# Patient Record
Sex: MALE | Race: WHITE
[De-identification: names, ages, dates, MRNs, and addresses within clinical notes are randomized per-mention and may not be internally consistent; named-entity substitution may affect disease eponyms.]

---

## 2020-12-02 ENCOUNTER — HOSPITAL ENCOUNTER (OUTPATIENT)
Dept: HOSPITAL 62 - ER | Age: 55
Setting detail: OBSERVATION
LOS: 2 days | Discharge: HOME | End: 2020-12-04
Attending: STUDENT IN AN ORGANIZED HEALTH CARE EDUCATION/TRAINING PROGRAM | Admitting: HOSPITALIST
Payer: SELF-PAY

## 2020-12-02 DIAGNOSIS — I10: ICD-10-CM

## 2020-12-02 DIAGNOSIS — Z79.899: ICD-10-CM

## 2020-12-02 DIAGNOSIS — J96.02: Primary | ICD-10-CM

## 2020-12-02 DIAGNOSIS — F17.210: ICD-10-CM

## 2020-12-02 DIAGNOSIS — L98.8: ICD-10-CM

## 2020-12-02 DIAGNOSIS — J96.01: ICD-10-CM

## 2020-12-02 DIAGNOSIS — Z79.84: ICD-10-CM

## 2020-12-02 DIAGNOSIS — E66.2: ICD-10-CM

## 2020-12-02 DIAGNOSIS — E11.9: ICD-10-CM

## 2020-12-02 DIAGNOSIS — R60.0: ICD-10-CM

## 2020-12-02 LAB
ADD MANUAL DIFF: NO
ALBUMIN SERPL-MCNC: 4.1 G/DL (ref 3.5–5)
ALP SERPL-CCNC: 82 U/L (ref 38–126)
ANION GAP SERPL CALC-SCNC: 2 MMOL/L (ref 5–19)
APPEARANCE UR: CLEAR
APTT PPP: YELLOW S
AST SERPL-CCNC: 38 U/L (ref 17–59)
BASE EXCESS BLDV CALC-SCNC: 6.3 MMOL/L
BASOPHILS # BLD AUTO: 0 10^3/UL (ref 0–0.2)
BASOPHILS NFR BLD AUTO: 0.4 % (ref 0–2)
BILIRUB DIRECT SERPL-MCNC: 0.1 MG/DL (ref 0–0.4)
BILIRUB SERPL-MCNC: 0.5 MG/DL (ref 0.2–1.3)
BILIRUB UR QL STRIP: NEGATIVE
BUN SERPL-MCNC: 13 MG/DL (ref 7–20)
CALCIUM: 8.7 MG/DL (ref 8.4–10.2)
CHLORIDE SERPL-SCNC: 97 MMOL/L (ref 98–107)
CO2 SERPL-SCNC: 39 MMOL/L (ref 22–30)
CRP SERPL-MCNC: 21.8 MG/L (ref ?–10)
EOSINOPHIL # BLD AUTO: 0.4 10^3/UL (ref 0–0.6)
EOSINOPHIL NFR BLD AUTO: 3.9 % (ref 0–6)
ERYTHROCYTE [DISTWIDTH] IN BLOOD BY AUTOMATED COUNT: 14.1 % (ref 11.5–14)
FERRITIN SERPL-MCNC: 52.9 NG/ML (ref 17.9–464)
GLUCOSE SERPL-MCNC: 139 MG/DL (ref 75–110)
GLUCOSE UR STRIP-MCNC: NEGATIVE MG/DL
HCO3 BLDV-SCNC: 36.8 MMOL/L (ref 20–32)
HCT VFR BLD CALC: 44.5 % (ref 37.9–51)
HGB BLD-MCNC: 14.9 G/DL (ref 13.5–17)
KETONES UR STRIP-MCNC: NEGATIVE MG/DL
LYMPHOCYTES # BLD AUTO: 1.5 10^3/UL (ref 0.5–4.7)
LYMPHOCYTES NFR BLD AUTO: 14.3 % (ref 13–45)
MCH RBC QN AUTO: 32.7 PG (ref 27–33.4)
MCHC RBC AUTO-ENTMCNC: 33.4 G/DL (ref 32–36)
MCV RBC AUTO: 98 FL (ref 80–97)
MONOCYTES # BLD AUTO: 0.6 10^3/UL (ref 0.1–1.4)
MONOCYTES NFR BLD AUTO: 5.8 % (ref 3–13)
NEUTROPHILS # BLD AUTO: 7.9 10^3/UL (ref 1.7–8.2)
NEUTS SEG NFR BLD AUTO: 75.6 % (ref 42–78)
NITRITE UR QL STRIP: NEGATIVE
NT PRO BNP: 101 PG/ML (ref ?–125)
PCO2 BLDV: 82.3 MMHG (ref 35–63)
PH BLDV: 7.27 [PH] (ref 7.3–7.42)
PH UR STRIP: 5 [PH] (ref 5–9)
PLATELET # BLD: 205 10^3/UL (ref 150–450)
POTASSIUM SERPL-SCNC: 4.6 MMOL/L (ref 3.6–5)
PROT SERPL-MCNC: 7 G/DL (ref 6.3–8.2)
PROT UR STRIP-MCNC: 100 MG/DL
RBC # BLD AUTO: 4.55 10^6/UL (ref 4.35–5.55)
SP GR UR STRIP: 1.02
TOTAL CELLS COUNTED % (AUTO): 100 %
TROPONIN I SERPL-MCNC: < 0.012 NG/ML
UROBILINOGEN UR-MCNC: NEGATIVE MG/DL (ref ?–2)
WBC # BLD AUTO: 10.5 10^3/UL (ref 4–10.5)

## 2020-12-02 PROCEDURE — 84443 ASSAY THYROID STIM HORMONE: CPT

## 2020-12-02 PROCEDURE — 87040 BLOOD CULTURE FOR BACTERIA: CPT

## 2020-12-02 PROCEDURE — 85384 FIBRINOGEN ACTIVITY: CPT

## 2020-12-02 PROCEDURE — 83880 ASSAY OF NATRIURETIC PEPTIDE: CPT

## 2020-12-02 PROCEDURE — 83735 ASSAY OF MAGNESIUM: CPT

## 2020-12-02 PROCEDURE — G0378 HOSPITAL OBSERVATION PER HR: HCPCS

## 2020-12-02 PROCEDURE — 84439 ASSAY OF FREE THYROXINE: CPT

## 2020-12-02 PROCEDURE — 82803 BLOOD GASES ANY COMBINATION: CPT

## 2020-12-02 PROCEDURE — 83615 LACTATE (LD) (LDH) ENZYME: CPT

## 2020-12-02 PROCEDURE — 99285 EMERGENCY DEPT VISIT HI MDM: CPT

## 2020-12-02 PROCEDURE — 80048 BASIC METABOLIC PNL TOTAL CA: CPT

## 2020-12-02 PROCEDURE — 84484 ASSAY OF TROPONIN QUANT: CPT

## 2020-12-02 PROCEDURE — 36415 COLL VENOUS BLD VENIPUNCTURE: CPT

## 2020-12-02 PROCEDURE — 93971 EXTREMITY STUDY: CPT

## 2020-12-02 PROCEDURE — 93005 ELECTROCARDIOGRAM TRACING: CPT

## 2020-12-02 PROCEDURE — 81001 URINALYSIS AUTO W/SCOPE: CPT

## 2020-12-02 PROCEDURE — 80053 COMPREHEN METABOLIC PANEL: CPT

## 2020-12-02 PROCEDURE — 36600 WITHDRAWAL OF ARTERIAL BLOOD: CPT

## 2020-12-02 PROCEDURE — 82962 GLUCOSE BLOOD TEST: CPT

## 2020-12-02 PROCEDURE — 93306 TTE W/DOPPLER COMPLETE: CPT

## 2020-12-02 PROCEDURE — 87086 URINE CULTURE/COLONY COUNT: CPT

## 2020-12-02 PROCEDURE — 86140 C-REACTIVE PROTEIN: CPT

## 2020-12-02 PROCEDURE — 82728 ASSAY OF FERRITIN: CPT

## 2020-12-02 PROCEDURE — 71275 CT ANGIOGRAPHY CHEST: CPT

## 2020-12-02 PROCEDURE — 85025 COMPLETE CBC W/AUTO DIFF WBC: CPT

## 2020-12-02 PROCEDURE — 93010 ELECTROCARDIOGRAM REPORT: CPT

## 2020-12-02 PROCEDURE — 94660 CPAP INITIATION&MGMT: CPT

## 2020-12-02 PROCEDURE — 83036 HEMOGLOBIN GLYCOSYLATED A1C: CPT

## 2020-12-02 PROCEDURE — 85027 COMPLETE CBC AUTOMATED: CPT

## 2020-12-02 PROCEDURE — 87088 URINE BACTERIA CULTURE: CPT

## 2020-12-02 RX ADMIN — NICOTINE SCH: 14 PATCH, EXTENDED RELEASE TOPICAL at 18:54

## 2020-12-02 RX ADMIN — INSULIN LISPRO SCH UNIT: 100 INJECTION, SOLUTION INTRAVENOUS; SUBCUTANEOUS at 21:51

## 2020-12-02 RX ADMIN — ENOXAPARIN SODIUM SCH MG: 30 INJECTION SUBCUTANEOUS at 18:54

## 2020-12-02 NOTE — RADIOLOGY REPORT (SQ)
EXAM DESCRIPTION:  VENOUS UNILATERAL LOWER



IMAGES COMPLETED DATE/TIME:  12/2/2020 3:12 pm



REASON FOR STUDY:  left leg swelling/pain



COMPARISON:  None.



TECHNIQUE:  Dynamic and static gray scale and color images acquired of the left leg venous system. Se
lected spectral images acquired with additional compression and augmentation maneuvers. The contralat
eral common femoral vein and saphenofemoral junction were also imaged. Images stored on PACS.



LIMITATIONS:  None.



FINDINGS:  COMMON FEMORAL: Normal phasicity, compression and augmentation. No visualized echogenic ma
terial on gray scale. No defects on color images.

FEMORAL: Normal compression and augmentation. No visualized echogenic material on gray scale. No defe
cts on color images.

POPLITEAL: Normal compression, augmentation. No visualized echogenic material on gray scale. No defec
ts on color images.

CALF VESSELS: Normal compression, augmentation. No visualized echogenic material on gray scale. No de
fects on color images.

GSV and SSV: Normal compression, augmentation. No visualized echogenic material on gray scale. No def
ects on color images.

ANY DEEP VENOUS INSUFFICIENCY: Not evaluated.

ANY EVIDENCE OF POPLITEAL CYST: No.

OTHER: No other findings.

CONTRALATERAL COMMON FEMORAL VEIN:

Normal phasicity, compression and augmentation. No visualized echogenic material on gray scale. No de
fects on color images.



IMPRESSION:  NO EVIDENCE OF DVT OR SVT IN THE LEFT LEG.



TECHNICAL DOCUMENTATION:  JOB ID:  [id number]

 2011 [employer]- All Rights Reserved



Reading location - IP/workstation name: [email or web address]

## 2020-12-02 NOTE — PDOC H&P
History of Present Illness


Admission Date/PCP: 


  12/02/20 16:15





  LISBETH MONROE PA-C





Patient complains of: Exertional shortness of breath


History of Present Illness: 


BESS LOVELL is a 55 year old obese gentleman with PMHx diabetes mellitus 

(Metformin) and HTN (Lisinopril) who was referred by his PCP Lisbeth Monroe PA-C, 

for evaluation of possible PE/DVT. Patient reports 1 month history progressively

worsening exertional shortness of breath with associated palpitations, 

orthopnea, PND, and lower extremity swelling. SOB onset with exertion, resolves 

after 30sec-1 minute rest. Additionally notes x1 month hx disproportionately 

swollen LLE with associated pain, specifically after standing for long periods 

of time. Denies hx DVT/PE, recent travel/trauma/surgery. No known sick 

contacts/exposure, but has been actively working at [employer]. Admits to 40yr 

smoking history, 1/2 ppd. Upon further questioning denies fever, chills, chest 

pain, cough, abd pain, nausea, vomiting or diarrhea. 





Per ED provider pt with O2 sat 80% on initial evaluation. Currently 96% on 2L N

C. CBC unremarkable. Chemistries notable for hypercarbia, Cardiac enzymes 

negative, . CT chest concerning for patchy opacitis lower lung fields, 

cardiomegaly, and possible pulmonary hypertension; without pulmonary embolism or

effusion. LLE US without DVT. Patient treated with intranasal supplemental 

oxygen and referred to the hospitalist team for further evaluation, observation 

and management. 





Past Medical History


Cardiac Medical History: Reports: Hypertension


   Denies: Atrial Fibrillation, Congestive Heart Failure, Coronary Artery 

Disease, DVT, Myocardial Infarction, Peripheral Vascular Disease, Pulmonary 

Embolism


Pulmonary Medical History: 


   Denies: Asthma, Bronchitis, Chronic Obstructive Pulmonary Disease (COPD), Re

spiratory Failure


Neurological Medical History: 


   Denies: Hemorrhagic CVA, Ischemic CVA, Migraine


Endocrine Medical History: Reports: Diabetes Mellitus Type 2, Obesity


   Denies: Hyperthyroidism, Hypothyroidism


Renal/ Medical History: Reports: Nephrolithiasis


   Denies: Chronic Kidney Disease


Malignancy Medical History: Reports: None


GI Medical History: 


   Denies: Diverticulitis, Gastroesophageal Reflux Disease


Musculoskeltal Medical History: Reports: Arthritis


   Denies: Gout


Skin Medical History: 


   Denies: Eczema, Psoriasis


Psychiatric Medical History: Reports: Tobacco Dependency


   Denies: Alcohol Dependency, Depression, General Anxiety Disorder


Hematology: 


   Denies: Anemia, Bleeding Tendencies





Past Surgical History


Past Surgical History: Reports: Appendectomy - 01/01/1984





Social History


Information Source: Patient


Lives with: Spouse/Significant other


Smoking Status: Current Every Day Smoker


Cigarettes Packs Per Day: 0.5


Electronic Cigarette use?: No


Frequency of Alcohol Use: None


Hx Recreational Drug Use: No


Hx Prescription Drug Abuse: No





- Advance Directive


Resuscitation Status: Full Code





Family History


Family History: DM, Hypertension


Parental Family History Reviewed: Yes


Children Family History Reviewed: Yes


Sibling(s) Family History Reviewed.: Yes





Medication/Allergy


Home Medications: 








Gabapentin [Neurontin 100 mg Capsule] 100 mg PO Q12 12/02/20 


Lisinopril [Prinivil 10 mg Tablet] 10 mg PO DAILY 12/02/20 


Metformin HCl [Metformin HCl ER] 750 mg PO DAILY 12/02/20 


Multivit-Min/FA/Lycopen/Lutein [Centrum Silver Men Tablet] 1 each PO DAILY 

12/02/20 


Tamsulosin HCl [Flomax 0.4 mg Cap.sr] 0.4 mg PO DAILY 12/02/20 








Allergies/Adverse Reactions: 


                                        





aspirin Allergy (Verified 12/02/20 12:04)


   











Review of Systems


Constitutional: ABSENT: chills, fatigue, fever(s), headache(s), weakness


Eyes: ABSENT: visual disturbances


Ears: ABSENT: hearing changes


Nose, Mouth, and Throat: ABSENT: headache(s), sore throat, vertigo


Cardiovascular: PRESENT: dyspnea on exertion, edema, orthropnea, palpitations.  

ABSENT: chest pain


Respiratory: ABSENT: cough, sputum


Gastrointestinal: ABSENT: abdominal pain, constipation, diarrhea, nausea, 

vomiting


Genitourinary: PRESENT: difficulty urinating - Secondary to scrotal swelling.  

ABSENT: dysuria, hematuria


Musculoskeletal: PRESENT: back pain - chronic low back pain


Integumentary: ABSENT: erythema, rash, wounds


Neurological: ABSENT: abnormal speech, confusion, paresthesias, syncope, 

tremor(s), vertigo


Psychiatric: ABSENT: anxiety, depression


Endocrine: ABSENT: polydipsia, polyphagia, polyuria


Hematologic/Lymphatic: ABSENT: easy bleeding, lymphadenopathy





Physical Exam


Vital Signs: 


                                        











Temp Pulse Resp BP Pulse Ox


 


 99.2 F   96   17   134/76 H  96 


 


 12/02/20 10:58  12/02/20 10:58  12/02/20 16:01  12/02/20 16:01  12/02/20 16:01








                                 Intake & Output











 12/01/20 12/02/20 12/03/20





 06:59 06:59 06:59


 


Weight   160 kg











General appearance: PRESENT: no acute distress, cooperative, obese


Head exam: PRESENT: atraumatic, normocephalic


Eye exam: PRESENT: EOMI.  ABSENT: scleral icterus


Mouth exam: PRESENT: moist, tongue midline


Teeth exam: PRESENT: poor dentation


Throat exam: ABSENT: post pharyngeal erythema


Neck exam: PRESENT: full ROM.  ABSENT: carotid bruit, JVD, lymphadenopathy, 

tenderness, thyromegaly


Respiratory exam: PRESENT: crackles - bilateral lower lobes, decreased breath 

sounds - Difficult to auscultate due to body habitus., symmetrical, unlabored.  

ABSENT: tachypnea, wheezes


Cardiovascular exam: PRESENT: RRR, +S1, +S2.  ABSENT: diastolic murmur, systolic

murmur


Pulses: PRESENT: normal radial pulses, normal dorsalis pedis pul


GI/Abdominal exam: PRESENT: normal bowel sounds, soft.  ABSENT: distended, firm,

tenderness


Rectal exam: PRESENT: deferred


Gentrourinary exam: PRESENT: erythema - Penile shaft blacnhed white in 

apperance. Glans penis bright red. There is a There is a yellow crusted lesion 

just infeior to penis. Without discharge, without warmth, without tenderness..  

ABSENT: scrotal swelling


Extremities exam: PRESENT: full ROM, pedal edema - Left lower extremity greater 

in diameter when compared to right lower extremity. Without warmth, redness or 

TTP.


Musculoskeletal exam: PRESENT: ambulatory, full ROM.  ABSENT: deformity, 

dislocation


Neurological exam: PRESENT: alert, awake, oriented to person, oriented to place,

oriented to time, oriented to situation, CN II-XII grossly intact


Psychiatric exam: PRESENT: appropriate affect, normal mood


Skin exam: PRESENT: dry, intact, warm





Results


Laboratory Results: 


                                        





                                 12/02/20 12:11 





                                 12/02/20 12:11 





                                        











  12/02/20 12/02/20





  12:11 12:11


 


WBC  10.5 


 


RBC  4.55 


 


Hgb  14.9 


 


Hct  44.5 


 


MCV  98 H 


 


MCH  32.7 


 


MCHC  33.4 


 


RDW  14.1 H 


 


Plt Count  205 


 


Seg Neutrophils %  75.6 


 


Sodium   137.7


 


Potassium   4.6


 


Chloride   97 L


 


Carbon Dioxide   39 H


 


Anion Gap   2 L


 


BUN   13


 


Creatinine   0.60


 


Est GFR (African Amer)   > 60


 


Glucose   139 H


 


Calcium   8.7


 


Total Bilirubin   0.5


 


AST   38


 


Alkaline Phosphatase   82


 


Total Protein   7.0


 


Albumin   4.1








                                        











  12/02/20





  12:11


 


Troponin I  < 0.012


 


NT-Pro-B Natriuret Pep  101











Impressions: 


                                        





Chest/Abdomen CTA  12/02/20 11:20


IMPRESSION:  1.  Cardiomegaly without pulmonary edema.


2.  Dilated main pulmonary artery - clinical correlation is recommended to 

exclude pulmonary hypertension


3.  No pulmonary emboli.


4.  The patchy opacities in the dependent portions of the lower lobes are 

favored to represent atelectasis, however correlation with clinical findings to 

exclude a pneumonia is recommended.


 








Venous Doppler Study  12/02/20 11:20


IMPRESSION:  NO EVIDENCE OF DVT OR SVT IN THE LEFT LEG.


 














Assessment and Plan





- Diagnosis


(1) Acute respiratory failure with hypoxia and hypercarbia


Is this a current diagnosis for this admission?: Yes   


Plan: 


On initial evaluation O2 sat 80% on room air. Currently 96% on 2L NC. 


CO2 39 on BMP.


VBG pending. 





CT without evidence of pulmonary embolism or effusion. Evidence of bilateral 

lower lobe opacities and pulmonary HTN. 


  - No evidence of acute infectious process (afebrile, without cough, without 

leukocytosis) 


  - BC pending


  - Low suspicion of COVID. 


   - LDH, CRP, ferritin, fibrinogen pending. If elevated consider Covid test. 


  - Hold on abx therapy at this time.





Likely secondary to obesity hypoventilation syndrome in combination with 

undiagnosed obstructive sleep apnea.


  - OHS and ANA contributing to suspected pulmonary HTN, leading to right sided 

heart failure


  - Additionally demonstrating signs of L-sided heart failure, though bnp 101 

and initial EKG without LVH. 


  - Echo pending 


  - Repeat EKG pending 


  - Initiate IV lasix 





Patient stable. Wean O2 therapy gradually. 


Monitor O2 sat. 








(2) Exertional shortness of breath


Is this a current diagnosis for this admission?: Yes   


Plan: 


With palpitations, without CP.


  - Without hx CAD. 


  - Given pt's age, gender, and obesity pt is at high risk


  - Echo pending 


  - Plan for outpatient stress test


  - Consider cardio consult 





Treatment otherwise as above 








(3) Obesity hypoventilation syndrome


Is this a current diagnosis for this admission?: Yes   


Plan: 


Likely the cause of hypoxia and hypercarbia. 


  - BMI 49


  - O2 sat 80% on RA on initial presentation, 98% 2L NC.


  - O2 sat 85% as per PCP note on 11/30/2020. 


  - CO2 39 on BMP.


  - VBG pending 





Without dx ANA, though likely given weight and hx snoring


  - Follow up sleep study outpatient setting 





CT chest with evidence of pulmonary HTN, likely secondary to OHS + ANA


  - Echo pending








(4) Lower extremity edema


Is this a current diagnosis for this admission?: Yes   


Plan: 


LLE > RLE. Pitting edema bilaterally. 


  - LLE US without DVT


  - CT with evidence pulmonary HTN, Echo pending 


  - Initiate lasix IV


  - Recommend compression stocking 20-30mmHg 


  - Monitor for improvement 








(5) Lesion of penis


Is this a current diagnosis for this admission?: Yes   


Plan: 


Reports 2 month history change in penile appearance.


Not sexually active.


Denies urinary symptoms at this time.


Reports burning sensation of skin with moisture. 


Has treated with Neosporin without noted improvement. 


  - UA with UC pending 


  - Skin swab pending 


  - STI testing pending





Resembles lichen sclerosus 


  - Unfortunately no dermatology on campus 


  - Outpatient dermatology follow up  








(6) Diabetes mellitus type II, non insulin dependent


Is this a current diagnosis for this admission?: Yes   


Plan: 


Home medication includes Metformin 750mg daily.


  - Hold home medication


  - Initiate SSI


  - Accu-checks 


  - Hem A1c in the morning 


  - Hypoglycemic protocol in place 








(7) Hypertension


Qualifiers: 


   Hypertension type: essential hypertension   Qualified Code(s): I10 - 

Essential (primary) hypertension   


Is this a current diagnosis for this admission?: Yes   


Plan: 


Bp 130/70s. 


Home medication includes Lisinopril.


  - Resume home medications


  - Monitor 








(8) Obesity, Class III, BMI 40-49.9 (morbid obesity)


Is this a current diagnosis for this admission?: Yes   


Plan: 


BMI 49.0. 


  - Educated on lifestyle and dietary changes.








(9) Tobacco dependence


Is this a current diagnosis for this admission?: Yes   


Plan: 


40 year history. Smokes 1/2 ppd.


  - Provided >3 minutes education and encouragement on smoking cessation. 


  - Nicotine patch provided. 








- Time


Time Spent with patient: 35 or more minutes


Smoking Cessation Education: 3 to 10 minutes


Medications reviewed and adjusted accordingly: Yes


Anticipated Discharge Disposition: Home, Self Care


Anticipated Discharge Timeframe: within 24 hours

## 2020-12-02 NOTE — RADIOLOGY REPORT (SQ)
EXAM DESCRIPTION:  CTA CHEST



IMAGES COMPLETED DATE/TIME:  12/2/2020 2:15 pm



REASON FOR STUDY:  sob



COMPARISON:  None.



TECHNIQUE:  CT scan of the chest performed using helical scanning technique with dynamic intravenous 
contrast injection.  Images reviewed with lung, soft tissue and bone windows.  Reconstructed coronal 
and sagittal MPR images reviewed.

Additional 3 dimensional post-processing performed to develop Maximal Intensity Projection images (MI
P).  All images stored on PACS.

All CT scanners at this facility use dose modulation, iterative reconstruction, and/or weight based d
osing when appropriate to reduce radiation dose to as low as reasonably achievable (ALARA).

[facility]: Dose Right  [facility]: CareDose    [facility]: Dose Right    [facility]: Teradose 4D    [facility]: Smart Technologies



CONTRAST TYPE AND DOSE:  Contrast/concentration: Isovue 350.00 mmol/ml; Total Contrast Delivered: 75.
0 ml; Total Saline Delivered: 63.1 ml

Contrast bolus optimized for the pulmonary arteries.



RENAL FUNCTION:  GFR > 60.



RADIATION DOSE:  CT Rad equipment meets quality standard of care and radiation dose reduction techniq
ues were employed. CTDIvol: 6.6 - 41.8 mGy. DLP: 1467 mGy-cm.



LIMITATIONS:  None.



FINDINGS:  LUNGS AND PLEURA: The trachea main bronchi are patent.  The patchy opacities in the depend
ent portions of the lower lobes are favored to represent atelectasis.  There is no consolidation, ple
ural effusion or pneumothorax.

AORTA AND GREAT VESSELS: No aneurysm or dissection of the thoracic aorta.

HEART: Cardiomegaly and mild atherosclerotic calcification of the coronary arteries.  There is no per
icardial effusion.

PULMONARY ARTERIES: The pulmonary artery is dilated and it measures 3.6 cm.  There is no pulmonary em
bolus.

HILAR AND MEDIASTINAL STRUCTURES: No adenopathy or mass.

HARDWARE: None in the chest.

UPPER ABDOMEN: Hepatic steatosis.

THYROID AND OTHER SOFT TISSUES: No adenopathy or mass.

BONES: No fracture or osseous lesion.

3D MIPS: Confirm above findings.

OTHER: No other findings.



IMPRESSION:  1.  Cardiomegaly without pulmonary edema.

2.  Dilated main pulmonary artery - clinical correlation is recommended to exclude pulmonary hyperten
pasah

3.  No pulmonary emboli.

4.  The patchy opacities in the dependent portions of the lower lobes are favored to represent atelec
tasis, however correlation with clinical findings to exclude a pneumonia is recommended.



COMMENT:  Quality ID # [id number]: Final reports with documentation of one or more dose reduction techniques
 (e.g., Automated exposure control, adjustment of the mA and/or kV according to patient size, use of 
iterative reconstruction technique)



TECHNICAL DOCUMENTATION:  JOB ID:  [id number]

 2011 [employer]- All Rights Reserved



Reading location - IP/workstation name: [facility]-

## 2020-12-02 NOTE — EKG REPORT
SEVERITY:- ABNORMAL ECG -

EXCESSIVE ARTIFACT: IMPACTS ASSESSMENT

SINUS RHYTHM

LEFT POSTERIOR FASCICULAR BLOCK

:

Confirmed by: Ethan Muñoz MD 02-Dec-2020 19:07:17

## 2020-12-02 NOTE — EKG REPORT
SEVERITY:- OTHERWISE NORMAL ECG -

SINUS RHYTHM

RIGHT AXIS DEVIATION

:

Confirmed by: Ethan Muñoz MD 02-Dec-2020 19:06:39

## 2020-12-02 NOTE — ER DOCUMENT REPORT
ED Extremity Problem, Lower





- General


Chief Complaint: Leg Pain


Stated Complaint: LEFT LEG PAIN,SWELLING


Time Seen by Provider: 12/02/20 11:09


Primary Care Provider: 


LISBETH MONROE PA-C [Primary Care Provider] - Follow up as needed


Mode of Arrival: Ambulatory


Information source: Patient





- HPI


Notes: 





Patient presents with shortness of breath as well as left lower extremity pain 

and swelling.  Patient shortness of breath is been going on for approximately 1 

month.  And progressively getting worse.  He also is been having worsening pain 

and swelling over the last month.  Patient states that shortness of breath is 

worse with exertion and better with rest.  He denies any significant cough or 

cold symptoms.  Patient has had no fever sweats or chills.  No vomiting or 

diarrhea.  He denies any previous history of DVTs or PEs.  He denies any 

significant past cardiac history.  Patient states he went to see his primary 

care doctor today for the leg swelling and pain and when he told him that he was

short of breath he was referred here to the emergency department.





- Related Data


Allergies/Adverse Reactions: 


                                        





aspirin Allergy (Verified 12/02/20 12:04)


   











Past Medical History





- General


Information source: Patient





- Social History


Smoking Status: Current Every Day Smoker


Frequency of alcohol use: None


Drug Abuse: None


Family History: Reviewed & Not Pertinent





Review of Systems





- Review of Systems


Constitutional: denies: Chills, Fever


Cardiovascular: denies: Chest pain, Palpitations


Respiratory: Short of breath.  denies: Hemoptysis


-: Yes All other systems reviewed and negative





Physical Exam





- Vital signs


Vitals: 





                                        











Temp Pulse Resp BP Pulse Ox


 


 99.2 F   96   24 H  135/98 H  87 L


 


 12/02/20 10:58  12/02/20 10:58  12/02/20 10:58  12/02/20 10:58  12/02/20 10:58











Interpretation: Normal





- General


General appearance: Appears well, Alert





- HEENT


Head: Normocephalic, Atraumatic


Eyes: Normal


Pupils: PERRL





- Respiratory


Respiratory status: No respiratory distress


Chest status: Nontender


Breath sounds: Decreased air movement


Chest palpation: Normal





- Cardiovascular


Rhythm: Regular


Heart sounds: Normal auscultation


Murmur: No





- Abdominal


Inspection: Normal


Distension: No distension


Bowel sounds: Normal


Tenderness: Nontender


Organomegaly: No organomegaly





- Back


Back: Normal, Nontender





- Extremities


General upper extremity: Normal inspection, Nontender, Normal color, Normal ROM,

Normal temperature


General lower extremity: Nontender, Edema - left greater than right, Normal 

color, Normal ROM, Normal temperature.  No: Dulce's sign





- Neurological


Neuro grossly intact: Yes


Cognition: Normal


Orientation: AAOx4


[place]ent Coma Scale Eye Opening: Spontaneous


[place] Coma Scale Verbal: Oriented


Kent Coma Scale Motor: Obeys Commands


Kent Coma Scale Total: 15


Speech: Normal


Motor strength normal: LUE, RUE, LLE, RLE


Sensory: Normal





- Psychological


Associated symptoms: Normal affect, Normal mood





- Skin


Skin Temperature: Warm


Skin Moisture: Dry


Skin Color: Erythema





Course





- Re-evaluation


Re-evalutation: 





12/02/20 15:09


Patient presents with leg swelling and shortness of breath.  Patient has 

cardiomegaly and some evidence of pulmonary hypertension however he has no 

pulmonary edema or evidence of failure.  He has no evidence of DVT or PE.  He do

es have some opacities in the bases the possibly could be infectious although he

does not have an elevated white blood cell count or significant infectious 

symptoms.  However patient is definitely hypoxic with an 85% room air 

saturation.  No known Covid exposures.  Due to patient's significant hypoxia on 

room air patient will be admitted to the hospital.





- Vital Signs


Vital signs: 





                                        











Temp Pulse Resp BP Pulse Ox


 


 99.2 F   96   18   129/68 H  92 


 


 12/02/20 10:58  12/02/20 10:58  12/02/20 13:01  12/02/20 13:01  12/02/20 13:01














- Laboratory


Result Diagrams: 


                                 12/02/20 12:11





                                 12/02/20 12:11


Laboratory results interpreted by me: 





                                        











  12/02/20 12/02/20





  12:11 12:11


 


MCV  98 H 


 


RDW  14.1 H 


 


Chloride   97 L


 


Carbon Dioxide   39 H


 


Anion Gap   2 L


 


Glucose   139 H


 


ALT   67 H














- Diagnostic Test


Radiology reviewed: Image reviewed, Reports reviewed





- EKG Interpretation by Me


EKG shows normal: Sinus rhythm


Rate: Normal - 87


Rhythm: NSR


Axis/QRS: No: Right axis deviation, Left axis deviation





Critical Care Note





- Critical Care Note


Total time excluding time spent on procedures (mins): 55


Comments: 





Approximate 55 minutes of critical care time were spent on this hypoxic patient.

 This time is spent reviewing laboratory values.  I spent reviewing imaging.  Is

spent talking to consultants.  He has been doing multiple reassessments.





Discharge





- Discharge


Clinical Impression: 


Respiratory failure with hypoxia


Qualifiers:


 Chronicity: acute Qualified Code(s): J96.01 - Acute respiratory failure with 

hypoxia





Condition: Serious


Disposition: ADMITTED AS INPATIENT


Admitting Provider: Nicole (Hospitalist)


Unit Admitted: Telemetry


Referrals: 


LISBETH MONROE PA-C [Primary Care Provider] - Follow up as needed

## 2020-12-03 LAB
ANION GAP SERPL CALC-SCNC: 4 MMOL/L (ref 5–19)
ARTERIAL BLOOD FIO2: (no result)
ARTERIAL BLOOD H2CO3: 2.09 MMOL/L (ref 1.05–1.35)
ARTERIAL BLOOD HCO3: 38.5 MMOL/L (ref 20–24)
ARTERIAL BLOOD PCO2: 69.3 MMHG (ref 35–45)
ARTERIAL BLOOD PH: 7.36 (ref 7.35–7.45)
ARTERIAL BLOOD PO2: 46.6 MMHG (ref 80–100)
ARTERIAL BLOOD TOTAL CO2: 40.7 MMOL/L (ref 23–27)
BASE EXCESS BLDA CALC-SCNC: 9.9 MMOL/L
BASE EXCESS BLDV CALC-SCNC: 5.7 MMOL/L
BUN SERPL-MCNC: 12 MG/DL (ref 7–20)
CALCIUM: 9 MG/DL (ref 8.4–10.2)
CHLORIDE SERPL-SCNC: 94 MMOL/L (ref 98–107)
CO2 SERPL-SCNC: 38 MMOL/L (ref 22–30)
ERYTHROCYTE [DISTWIDTH] IN BLOOD BY AUTOMATED COUNT: 14.5 % (ref 11.5–14)
FREE T4 (FREE THYROXINE): 0.91 NG/DL (ref 0.78–2.19)
GLUCOSE SERPL-MCNC: 118 MG/DL (ref 75–110)
HCO3 BLDV-SCNC: 35.3 MMOL/L (ref 20–32)
HCT VFR BLD CALC: 44.2 % (ref 37.9–51)
HGB BLD-MCNC: 14.7 G/DL (ref 13.5–17)
MCH RBC QN AUTO: 33 PG (ref 27–33.4)
MCHC RBC AUTO-ENTMCNC: 33.4 G/DL (ref 32–36)
MCV RBC AUTO: 99 FL (ref 80–97)
PCO2 BLDV: 74 MMHG (ref 35–63)
PH BLDV: 7.3 [PH] (ref 7.3–7.42)
PLATELET # BLD: 210 10^3/UL (ref 150–450)
POTASSIUM SERPL-SCNC: 4.9 MMOL/L (ref 3.6–5)
RBC # BLD AUTO: 4.46 10^6/UL (ref 4.35–5.55)
SAO2 % BLDA: 79.3 % (ref 94–98)
TSH SERPL-ACNC: 1.32 UIU/ML (ref 0.47–4.68)
WBC # BLD AUTO: 11.8 10^3/UL (ref 4–10.5)

## 2020-12-03 RX ADMIN — INSULIN LISPRO SCH: 100 INJECTION, SOLUTION INTRAVENOUS; SUBCUTANEOUS at 08:12

## 2020-12-03 RX ADMIN — LISINOPRIL SCH MG: 10 TABLET ORAL at 09:19

## 2020-12-03 RX ADMIN — INSULIN LISPRO SCH: 100 INJECTION, SOLUTION INTRAVENOUS; SUBCUTANEOUS at 15:40

## 2020-12-03 RX ADMIN — INSULIN LISPRO SCH: 100 INJECTION, SOLUTION INTRAVENOUS; SUBCUTANEOUS at 13:31

## 2020-12-03 RX ADMIN — ENOXAPARIN SODIUM SCH MG: 30 INJECTION SUBCUTANEOUS at 09:19

## 2020-12-03 RX ADMIN — GABAPENTIN SCH MG: 100 CAPSULE ORAL at 21:22

## 2020-12-03 RX ADMIN — METFORMIN HYDROCHLORIDE SCH MG: 500 TABLET, FILM COATED ORAL at 16:29

## 2020-12-03 RX ADMIN — GABAPENTIN SCH MG: 100 CAPSULE ORAL at 09:19

## 2020-12-03 RX ADMIN — INSULIN LISPRO SCH: 100 INJECTION, SOLUTION INTRAVENOUS; SUBCUTANEOUS at 21:19

## 2020-12-03 RX ADMIN — METFORMIN HYDROCHLORIDE SCH MG: 500 TABLET, FILM COATED ORAL at 09:18

## 2020-12-03 RX ADMIN — NICOTINE SCH: 14 PATCH, EXTENDED RELEASE TOPICAL at 09:20

## 2020-12-03 NOTE — PDOC CONSULTATION
Consultation


Consult Date: 20


Provider Consulted: JESSICA MARTIN


Consult reason:: chronic resp failure





History of Present Illness


Admission Date/PCP: 


  20 16:15





  LISBETH MONROE PA-C





History of Present Illness: 


BESS LOVELL is a 55 year old male;Complaint of shortness of breath and swelling

in his left lower extremity presented to the emergency room for dyspnea and was 

found to have SaO2 of 80% and was started on oxygen which improved his SaO2 to 

96%.  He remains on oxygen at this time and his saturations drop significantly 

particular when he is asleep.  He denies significant cough hemoptysis PPD status

unknown no history of chronic lung disease as a child or adolescent he missed to

be exposed while smoke as an adult and he is smoked half a pack a day for 50 

years.  No occupational exposure to potential respiratory toxins.  He does admit

to snoring and restless sleep nocturia 2-3 times per night unrestful sleep and 

daytime sounds





Past Medical History


Cardiac Medical History: Reports: Hypertension


   Denies: Atrial Fibrillation, Congestive Heart Failure, Coronary Artery 

Disease, DVT, Myocardial Infarction, Peripheral Vascular Disease, Pulmonary 

Embolism


Pulmonary Medical History: 


   Denies: Asthma, Bronchitis, Chronic Obstructive Pulmonary Disease (COPD), 

Respiratory Failure


Neurological Medical History: 


   Denies: Hemorrhagic CVA, Ischemic CVA, Migraine


Endocrine Medical History: Reports: Diabetes Mellitus Type 2, Obesity


   Denies: Hyperthyroidism, Hypothyroidism


Renal/ Medical History: Reports: Nephrolithiasis


   Denies: Chronic Kidney Disease


Malignancy Medical History: Reports: None


GI Medical History: 


   Denies: Diverticulitis, Gastroesophageal Reflux Disease


Musculoskeltal Medical History: Reports: Arthritis


   Denies: Gout


Skin Medical History: 


   Denies: Eczema, Psoriasis


Psychiatric Medical History: Reports: Tobacco Dependency


   Denies: Alcohol Dependency, Depression, General Anxiety Disorder


Hematology: 


   Denies: Anemia, Bleeding Tendencies





Past Surgical History


Past Surgical History: Reports: Appendectomy - 1984





Social History


Information Source: [facility] Records


Lives with: Spouse/Significant other


Smoking Status: Current Every Day Smoker


Cigarettes Packs Per Day: 0.5


Number of Years Smokin


Passive smoke exposure as: Both


Frequency of Alcohol Use: None


Hx Recreational Drug Use: No


Drugs: None


Hx Prescription Drug Abuse: No


Do you have pets?: No


Have you had any respiratory illnesses as a child?: No


Have you been exposed to any sick contacts recently?: No


Have you travelled outside of [place] in the past 12 months?: No





- Advance Directive


Resuscitation Status: Full Code





Family History


Family History: DM, Hypertension


Parental Family History Reviewed: No


Children Family History Reviewed: No


Sibling(s) Family History Reviewed.: No





Medication/Allergy


Home Medications: 








Gabapentin [Neurontin 100 mg Capsule] 100 mg PO Q12 20 


Lisinopril [Prinivil 10 mg Tablet] 10 mg PO DAILY 20 


Metformin HCl [Metformin HCl ER] 750 mg PO DAILY 20 


Multivit-Min/FA/Lycopen/Lutein [Centrum Silver Men Tablet] 1 each PO DAILY 

20 


Tamsulosin HCl [Flomax 0.4 mg Cap.sr] 0.4 mg PO DAILY 20 








Allergies/Adverse Reactions: 


                                        





aspirin Allergy (Verified 20 12:04)


   











Physical Exam


Vital Signs: 


                                        











Temp Pulse Resp BP Pulse Ox


 


 98.6 F   87   22 H  142/83 H  94 


 


 20 10:00  20 23:27  20 08:00  20 08:00  20 08:00








                                 Intake & Output











 20





 06:59 06:59 06:59


 


Intake Total  765 


 


Output Total  500 


 


Balance  265 


 


Weight  159.2 kg 











General appearance: PRESENT: no acute distress, cooperative, disheveled, 

morbidly obese, well-developed, well-nourished


Head exam: PRESENT: atraumatic, normocephalic


Eye exam: PRESENT: conjunctiva pale, EOMI.  ABSENT: nystagmus, periorbital 

swelling, scleral icterus


Mouth exam: PRESENT: moist, neck supple, tongue midline


Neck exam: ABSENT: carotid bruit, full ROM, JVD, lymphadenopathy, meningismus, 

tenderness, thyromegaly, tracheal deviation, tracheostomy, other


Respiratory exam: PRESENT: decreased breath sounds, prolonged expiratory phas, 

symmetrical, unlabored.  ABSENT: rales, retraction, stridor, tachypnea


Cardiovascular exam: PRESENT: RRR, +S1, +S2.  ABSENT: tachycardia


Pulses: PRESENT: normal radial pulses


GI/Abdominal exam: PRESENT: soft.  ABSENT: guarding, mass, rebound, tenderness


Extremities exam: PRESENT: pedal edema.  ABSENT: calf tenderness, clubbing, 

joint swelling, tenderness


Musculoskeletal exam: ABSENT: deformity, dislocation, tenderness


Neurological exam: PRESENT: alert, awake


Psychiatric exam: PRESENT: appropriate affect


Skin exam: PRESENT: dry, normal color, warm





Results


Laboratory Results: 


                                        





                                 20 06:57 





                                 20 06:57 





                                        











  20





  12:11 12:11 19:30


 


WBC  10.5  


 


RBC  4.55  


 


Hgb  14.9  


 


Hct  44.5  


 


MCV  98 H  


 


MCH  32.7  


 


MCHC  33.4  


 


RDW  14.1 H  


 


Plt Count  205  


 


Seg Neutrophils %  75.6  


 


VBG pH   


 


VBG pCO2   


 


VBG HCO3   


 


VBG Base Excess   


 


Sodium   137.7 


 


Potassium   4.6 


 


Chloride   97 L 


 


Carbon Dioxide   39 H 


 


Anion Gap   2 L 


 


BUN   13 


 


Creatinine   0.60 


 


Est GFR ( Amer)   > 60 


 


Glucose   139 H 


 


Calcium   8.7 


 


Magnesium   


 


Ferritin   


 


Total Bilirubin   0.5 


 


AST   38 


 


Alkaline Phosphatase   82 


 


C-Reactive Protein   


 


Total Protein   7.0 


 


Albumin   4.1 


 


TSH   


 


Free T4   


 


Urine Color    YELLOW


 


Urine Appearance    CLEAR


 


Urine pH    5.0


 


Ur Specific Gravity    1.024


 


Urine Protein    100 H


 


Urine Glucose (UA)    NEGATIVE


 


Urine Ketones    NEGATIVE


 


Urine Blood    NEGATIVE


 


Urine Nitrite    NEGATIVE


 


Ur Leukocyte Esterase    NEGATIVE


 


Urine WBC (Auto)    2


 


Urine RBC (Auto)    0














  20





  19:41 20:00 06:57


 


WBC    11.8 H


 


RBC    4.46


 


Hgb    14.7


 


Hct    44.2


 


MCV    99 H


 


MCH    33.0


 


MCHC    33.4


 


RDW    14.5 H


 


Plt Count    210


 


Seg Neutrophils %   


 


VBG pH  7.27 L  


 


VBG pCO2  82.3 H*  


 


VBG HCO3  36.8 H  


 


VBG Base Excess  6.3  


 


Sodium   


 


Potassium   


 


Chloride   


 


Carbon Dioxide   


 


Anion Gap   


 


BUN   


 


Creatinine   


 


Est GFR (African Amer)   


 


Glucose   


 


Calcium   


 


Magnesium   


 


Ferritin   52.90 


 


Total Bilirubin   


 


AST   


 


Alkaline Phosphatase   


 


C-Reactive Protein   21.8 H 


 


Total Protein   


 


Albumin   


 


TSH   


 


Free T4   


 


Urine Color   


 


Urine Appearance   


 


Urine pH   


 


Ur Specific Gravity   


 


Urine Protein   


 


Urine Glucose (UA)   


 


Urine Ketones   


 


Urine Blood   


 


Urine Nitrite   


 


Ur Leukocyte Esterase   


 


Urine WBC (Auto)   


 


Urine RBC (Auto)   














  20





  06:57 06:57 06:57


 


WBC   


 


RBC   


 


Hgb   


 


Hct   


 


MCV   


 


MCH   


 


MCHC   


 


RDW   


 


Plt Count   


 


Seg Neutrophils %   


 


VBG pH    7.30


 


VBG pCO2    74.0 H*


 


VBG HCO3    35.3 H


 


VBG Base Excess    5.7


 


Sodium  135.7 L  


 


Potassium  4.9  


 


Chloride  94 L  


 


Carbon Dioxide  38 H  


 


Anion Gap  4 L  


 


BUN  12  


 


Creatinine  0.60  


 


Est GFR ( Amer)  > 60  


 


Glucose  118 H  


 


Calcium  9.0  


 


Magnesium  2.3  


 


Ferritin   


 


Total Bilirubin   


 


AST   


 


Alkaline Phosphatase   


 


C-Reactive Protein   


 


Total Protein   


 


Albumin   


 


TSH   1.32 


 


Free T4   0.91 


 


Urine Color   


 


Urine Appearance   


 


Urine pH   


 


Ur Specific Gravity   


 


Urine Protein   


 


Urine Glucose (UA)   


 


Urine Ketones   


 


Urine Blood   


 


Urine Nitrite   


 


Ur Leukocyte Esterase   


 


Urine WBC (Auto)   


 


Urine RBC (Auto)   








                                        











  20





  12:11


 


Troponin I  < 0.012


 


NT-Pro-B Natriuret Pep  101











Impressions: 


                                        





Chest/Abdomen CTA  20 11:20


IMPRESSION:  1.  Cardiomegaly without pulmonary edema.


2.  Dilated main pulmonary artery - clinical correlation is recommended to 

exclude pulmonary hypertension


3.  No pulmonary emboli.


4.  The patchy opacities in the dependent portions of the lower lobes are 

favored to represent atelectasis, however correlation with clinical findings to 

exclude a pneumonia is recommended.


 








Venous Doppler Study  20 11:20


IMPRESSION:  NO EVIDENCE OF DVT OR SVT IN THE LEFT LEG.


 














Assessment & Plan





- Diagnosis


(1) Acute respiratory failure with hypoxia and hypercarbia


Is this a current diagnosis for this admission?: Yes   


Plan: 


Improving








(2) Hypertension


Qualifiers: 


   Hypertension type: essential hypertension   Qualified Code(s): I10 - 

Essential (primary) hypertension   


Is this a current diagnosis for this admission?: Yes   


Plan: 


Stable at this time








(3) Obesity hypoventilation syndrome


Is this a current diagnosis for this admission?: Yes   


Plan: 





The above patient has failed BiPAP with a patent airway.  This patient would 

benefit from noninvasive mechanical ventilation via the trilogy AVAPS/AE and 

faster responding AVAPS rates.  The trilogy is able to provide a target tidal 

volume and also adjusting the EPAP pressures to maintain a patent airway as well

as an oral backup rate this machine will help improve PaCO2 levels.  The 

severity of the patient's condition will lead to future hospitalizations and 

readmissions as well as life-threatening situations without the use of this 

device day and night. Trilogy home vent needed for hypercapnic respiratory anival

lure.


[facility] or [facility] to follow for trilogy set up.











(4) Obesity, Class III, BMI 40-49.9 (morbid obesity)


Is this a current diagnosis for this admission?: Yes   


Plan: 


Consider bariatric surgery, Dietary consult








- Time


Time Spent with patient: 





50

## 2020-12-03 NOTE — PDOC PROGRESS REPORT
Subjective


Date:: 12/03/20


Subjective:: 


Patient seen on morning rounds.  His brynn Kathleen is at the bedside.  BiPAP was

initiated by the nocturnist last night due to to PCO2 on VBG of 82.3.  Repeat 

VBG this morning 74. Patient seen on 2L with O2 sat 92%.  Supplemental oxygen 

removed while in room with notable desaturation to 84% while at rest on room 

air. Patient overall feels well. Reports decreased swelling in legs.  Continues 

to experience shortness of breath on exertion.  No other concerns or complaints 

today.


Discussed with nursing, reported VBG pCO2 values, otherwise no concerns. 


Reason For Visit: 


ACUTE RESPIRATORY FAILURE WITH HYPOXIA








Physical Exam


Vital Signs: 


                                        











Temp Pulse Resp BP Pulse Ox


 


 98.2 F   84   20   134/71 H  92 


 


 12/03/20 16:00  12/03/20 16:00  12/03/20 16:00  12/03/20 16:00  12/03/20 16:24








                                 Intake & Output











 12/02/20 12/03/20 12/04/20





 06:59 06:59 06:59


 


Intake Total  765 360


 


Output Total  500 


 


Balance  265 360


 


Weight  159.2 kg 











Additional comments: 


General appearance: PRESENT: no acute distress, cooperative, morbidly obese


Head exam: PRESENT: atraumatic, normocephalic


Eye exam: PRESENT: EOMI.  ABSENT: scleral icterus


Mouth exam: PRESENT: moist, tongue midline


Teeth exam: PRESENT: poor dentation


Throat exam: ABSENT: post pharyngeal erythema


Neck exam: PRESENT: full ROM.  ABSENT: carotid bruit, JVD, lymphadenopathy, 

tenderness, thyromegaly


Respiratory exam: PRESENT: crackles - bilateral lower lobes, decreased breath 

sounds - Difficult to auscultate due to body habitus., symmetrical, unlabored.  

ABSENT: tachypnea, wheezes


Cardiovascular exam: PRESENT: RRR, +S1, +S2.  ABSENT: diastolic murmur, systolic

murmur


Pulses: PRESENT: normal radial pulses, normal dorsalis pedis pul


GI/Abdominal exam: PRESENT: normal bowel sounds, soft.  ABSENT: distended, firm,

tenderness


Rectal exam: PRESENT: deferred


Gentrourinary exam: PRESENT: erythema - Penile shaft blacnhed white in 

apperance. Glans penis bright red. There is a There is a yellow crusted lesion 

just infeior to penis. Without discharge, without warmth, without tenderness..  

ABSENT: scrotal swelling


Extremities exam: PRESENT: full ROM, pedal edema - Left lower extremity greater 

in diameter when compared to right lower extremity. Without warmth, redness or 

TTP.


Musculoskeletal exam: PRESENT: ambulatory, full ROM.  ABSENT: deformity, 

dislocation


Neurological exam: PRESENT: alert, awake, oriented to person, oriented to place,

oriented to time, oriented to situation, CN II-XII grossly intact


Psychiatric exam: PRESENT: appropriate affect, normal mood


Skin exam: PRESENT: dry, intact, warm





Results


Laboratory Results: 


                                        





                                 12/03/20 06:57 





                                 12/03/20 06:57 





                                        











  12/02/20 12/02/20 12/02/20





  19:30 19:41 20:00


 


WBC   


 


RBC   


 


Hgb   


 


Hct   


 


MCV   


 


MCH   


 


MCHC   


 


RDW   


 


Plt Count   


 


Carbonic Acid   


 


HCO3/H2CO3 Ratio   


 


ABG pH   


 


ABG pCO2   


 


ABG pO2   


 


ABG HCO3   


 


ABG O2 Saturation   


 


ABG Base Excess   


 


VBG pH   7.27 L 


 


VBG pCO2   82.3 H* 


 


VBG HCO3   36.8 H 


 


VBG Base Excess   6.3 


 


FiO2   


 


Sodium   


 


Potassium   


 


Chloride   


 


Carbon Dioxide   


 


Anion Gap   


 


BUN   


 


Creatinine   


 


Est GFR (African Amer)   


 


Glucose   


 


Calcium   


 


Magnesium   


 


Ferritin    52.90


 


C-Reactive Protein    21.8 H


 


TSH   


 


Free T4   


 


Urine Color  YELLOW  


 


Urine Appearance  CLEAR  


 


Urine pH  5.0  


 


Ur Specific Gravity  1.024  


 


Urine Protein  100 H  


 


Urine Glucose (UA)  NEGATIVE  


 


Urine Ketones  NEGATIVE  


 


Urine Blood  NEGATIVE  


 


Urine Nitrite  NEGATIVE  


 


Ur Leukocyte Esterase  NEGATIVE  


 


Urine WBC (Auto)  2  


 


Urine RBC (Auto)  0  














  12/03/20 12/03/20 12/03/20





  06:57 06:57 06:57


 


WBC  11.8 H  


 


RBC  4.46  


 


Hgb  14.7  


 


Hct  44.2  


 


MCV  99 H  


 


MCH  33.0  


 


MCHC  33.4  


 


RDW  14.5 H  


 


Plt Count  210  


 


Carbonic Acid   


 


HCO3/H2CO3 Ratio   


 


ABG pH   


 


ABG pCO2   


 


ABG pO2   


 


ABG HCO3   


 


ABG O2 Saturation   


 


ABG Base Excess   


 


VBG pH   


 


VBG pCO2   


 


VBG HCO3   


 


VBG Base Excess   


 


FiO2   


 


Sodium   135.7 L 


 


Potassium   4.9 


 


Chloride   94 L 


 


Carbon Dioxide   38 H 


 


Anion Gap   4 L 


 


BUN   12 


 


Creatinine   0.60 


 


Est GFR ( Amer)   > 60 


 


Glucose   118 H 


 


Calcium   9.0 


 


Magnesium   2.3 


 


Ferritin   


 


C-Reactive Protein   


 


TSH    1.32


 


Free T4    0.91


 


Urine Color   


 


Urine Appearance   


 


Urine pH   


 


Ur Specific Gravity   


 


Urine Protein   


 


Urine Glucose (UA)   


 


Urine Ketones   


 


Urine Blood   


 


Urine Nitrite   


 


Ur Leukocyte Esterase   


 


Urine WBC (Auto)   


 


Urine RBC (Auto)   














  12/03/20 12/03/20





  06:57 12:38


 


WBC  


 


RBC  


 


Hgb  


 


Hct  


 


MCV  


 


MCH  


 


MCHC  


 


RDW  


 


Plt Count  


 


Carbonic Acid   2.09 H


 


HCO3/H2CO3 Ratio   18:1


 


ABG pH   7.36


 


ABG pCO2   69.3 H*


 


ABG pO2   46.6 L


 


ABG HCO3   38.5 H


 


ABG O2 Saturation   79.3 L


 


ABG Base Excess   9.9


 


VBG pH  7.30 


 


VBG pCO2  74.0 H* 


 


VBG HCO3  35.3 H 


 


VBG Base Excess  5.7 


 


FiO2   ROOM AIR


 


Sodium  


 


Potassium  


 


Chloride  


 


Carbon Dioxide  


 


Anion Gap  


 


BUN  


 


Creatinine  


 


Est GFR (African Amer)  


 


Glucose  


 


Calcium  


 


Magnesium  


 


Ferritin  


 


C-Reactive Protein  


 


TSH  


 


Free T4  


 


Urine Color  


 


Urine Appearance  


 


Urine pH  


 


Ur Specific Gravity  


 


Urine Protein  


 


Urine Glucose (UA)  


 


Urine Ketones  


 


Urine Blood  


 


Urine Nitrite  


 


Ur Leukocyte Esterase  


 


Urine WBC (Auto)  


 


Urine RBC (Auto)  








                                        





12/02/20 19:30   Clean Catch Midstream   Urine Culture - Final


                            Group C Beta Streptococcus





                                        











  12/02/20





  12:11


 


Troponin I  < 0.012


 


NT-Pro-B Natriuret Pep  101











Impressions: 


                                        





Chest/Abdomen CTA  12/02/20 11:20


IMPRESSION:  1.  Cardiomegaly without pulmonary edema.


2.  Dilated main pulmonary artery - clinical correlation is recommended to 

exclude pulmonary hypertension


3.  No pulmonary emboli.


4.  The patchy opacities in the dependent portions of the lower lobes are 

favored to represent atelectasis, however correlation with clinical findings to 

exclude a pneumonia is recommended.


 








Venous Doppler Study  12/02/20 11:20


IMPRESSION:  NO EVIDENCE OF DVT OR SVT IN THE LEFT LEG.


 














Assessment and Plan





- Diagnosis


(1) Acute respiratory failure with hypoxia and hypercarbia


Is this a current diagnosis for this admission?: Yes   


Plan: 


On initial evaluation O2 sat 80% on room air. Currently low 90s on 2L [place]. 


CO2 38 on BMP - indicating metabolic alkalosis


VBG s/p BiPap initiation pCO2 74.0. 





CT without evidence of pulmonary embolism or effusion. Evidence of bilateral 

lower lobe opacities and pulmonary HTN. 


  - No evidence of acute infectious process (afebrile, without cough, without 

leukocytosis) 


  - BC pending


  - Low suspicion of COVID. LDH, CRP, ferritin, fibrinogen all WNL. 


  - Cnt to hold abx therapy at this time.





Likely secondary to obesity hypoventilation syndrome in combination with undia

gnosed obstructive sleep apnea.


  - OHS and NAA contributing to suspected pulmonary HTN, leading to right sided 

heart failure


  - Additionally demonstrating signs of L-sided heart failure, though bnp 101 

and initial EKG without LVH. 


  - Echo pending 


  - Repeat EKG without changes 


  - Treated with IV Lasix, no longer indicated at this time. 





Pulmonology consulted, case discussed in detail, note reviewed.


  - Discussed in Dx #3. 








(2) Exertional shortness of breath


Is this a current diagnosis for this admission?: Yes   


Plan: 


With palpitations, without CP.


  - Without hx CAD. 


  - Given pt's age, gender, and obesity pt is at high risk


  - Echo pending 


  - Plan for outpatient stress test


  - Consider cardio consult 





Treatment otherwise as addressed in #1 and #3. 








(3) Obesity hypoventilation syndrome


Is this a current diagnosis for this admission?: Yes   


Plan: 


Likely the cause of hypoxia and hypercarbia. 


  - BMI 49


  - O2 sat 85% on RA at rest today, currently low 90s on 2L [place].


  - O2 sat 85% as per PCP note on 11/30/2020. 


  - Patient qualifies for home O2. Organized home O2. 





Dr. Mars, pulmonology consulted, discussed case, note reviewed in detail.


  - ABG s/p BiPap x12 hours pCO2 69.3


  - Failed BiPap treatment 


  - Recommends noninvasive mechanical ventilation via the trilogy AVAPS/AE 

patient agreeing 


  - Follow up with Dr. Mars outpatient 





Patient qualifies for home O2


  - O2 saturation at rest on RA 85%. 


  - Organize home O2. 








(4) Lower extremity edema


Is this a current diagnosis for this admission?: Yes   


Plan: 


LLE > RLE. Pitting edema bilaterally. 


  - LLE US without DVT


  - CT with evidence pulmonary HTN, Echo pending 


  -Significant improvement with IV Lasix, no longer indicated at this time.


  - Recommend compression stocking 20-30mmHg 








(5) Lesion of penis


Is this a current diagnosis for this admission?: Yes   


Plan: 


Reports 2 month history change in penile appearance.


Not sexually active.


Denies urinary symptoms at this time.


Reports burning sensation of skin with moisture. 


Has treated with Neosporin without noted improvement. 


  - UA without signs of infection


  - UC positive for Group C Beta Strep colony count 50,000-60,000 





Etiology unknown


DDx" Lichen sclerosis, Zoon's balantitis, Erythroplasia of [place], Maceracted 

due to urine 


  - Unfortunately no dermatology on campus 


  - Outpatient dermatology follow up , may require biopsy 


  - discussed with patient need to dermatology follow up for appropriate 

evaluation and treatment he is understanding of this


  - Set up dermatology follow up at discharge 








(6) Diabetes mellitus type II, non insulin dependent


Is this a current diagnosis for this admission?: Yes   


Plan: 


Home medication includes Metformin 750mg daily.


  - Hold home medication


  - Initiate SSI


  - Accu-checks 


  - Hem A1c 6.3


  - Hypoglycemic protocol in place 








(7) Hypertension


Qualifiers: 


   Hypertension type: essential hypertension   Qualified Code(s): I10 - 

Essential (primary) hypertension   


Is this a current diagnosis for this admission?: Yes   


Plan: 


Bp 130/70s. 


Home medication includes Lisinopril.


  - Resume home medications


  - Monitor 








(8) Obesity, Class III, BMI 40-49.9 (morbid obesity)


Is this a current diagnosis for this admission?: Yes   


Plan: 


BMI 49.0. 


  - Educated on lifestyle and dietary changes.


  - Candidate for bariatric surgery. 








(9) Tobacco dependence


Is this a current diagnosis for this admission?: Yes   


Plan: 


40 year history. Smokes 1/2 ppd.


  - Provided >3 minutes education and encouragement on smoking cessation. 


  - Nicotine patch provided. 








- Time


Time Spent with patient: 35 or more minutes


Medications reviewed and adjusted accordingly: Yes


Anticipated Discharge Disposition: Home, Self Care


Anticipated Discharge Timeframe: within 24 hours

## 2020-12-04 VITALS — DIASTOLIC BLOOD PRESSURE: 71 MMHG | SYSTOLIC BLOOD PRESSURE: 134 MMHG

## 2020-12-04 RX ADMIN — INSULIN LISPRO SCH: 100 INJECTION, SOLUTION INTRAVENOUS; SUBCUTANEOUS at 11:33

## 2020-12-04 RX ADMIN — NICOTINE SCH: 14 PATCH, EXTENDED RELEASE TOPICAL at 09:20

## 2020-12-04 RX ADMIN — LISINOPRIL SCH MG: 10 TABLET ORAL at 09:17

## 2020-12-04 RX ADMIN — ENOXAPARIN SODIUM SCH MG: 30 INJECTION SUBCUTANEOUS at 09:18

## 2020-12-04 RX ADMIN — INSULIN LISPRO SCH: 100 INJECTION, SOLUTION INTRAVENOUS; SUBCUTANEOUS at 08:21

## 2020-12-04 RX ADMIN — GABAPENTIN SCH MG: 100 CAPSULE ORAL at 09:18

## 2020-12-04 RX ADMIN — METFORMIN HYDROCHLORIDE SCH MG: 500 TABLET, FILM COATED ORAL at 08:20

## 2020-12-04 NOTE — PDOC DISCHARGE SUMMARY
Impression





- Admit/DC Date/PCP


Admission Date/Primary Care Provider: 


  12/02/20 16:15





  ELÍAS DOWLING PA-C





Discharge Date: 12/04/20





- Discharge Diagnosis


(1) Acute respiratory failure with hypoxia and hypercarbia


Is this a current diagnosis for this admission?: Yes   





(2) Exertional shortness of breath


Is this a current diagnosis for this admission?: Yes   





(3) Obesity hypoventilation syndrome


Is this a current diagnosis for this admission?: Yes   





(4) Lower extremity edema


Is this a current diagnosis for this admission?: Yes   





(5) Lesion of penis


Is this a current diagnosis for this admission?: Yes   





(6) Diabetes mellitus type II, non insulin dependent


Is this a current diagnosis for this admission?: Yes   





(7) Hypertension


Is this a current diagnosis for this admission?: Yes   





(8) Obesity, Class III, BMI 40-49.9 (morbid obesity)


Is this a current diagnosis for this admission?: Yes   





(9) Tobacco dependence


Is this a current diagnosis for this admission?: Yes   





- Additional Information


Resuscitation Status: Full Code


Discharge Diet: Cardiac, Diabetic


Discharge Activity: Activity As Tolerated


Referrals: 


LILA PATIÑO DO [ACTIVE STAFF] - 02/16/21 1:00 pm (Bring picture ID and 

insurance cards)


JESSICA MARTIN MD [ACTIVE STAFF] - 01/04/21 9:00 am


ELÍAS DOWLING PA-C [Primary Care Provider] - 12/14/20 10:00 am (Hosp Follow up)


Home Medications: 








Gabapentin [Neurontin 100 mg Capsule] 100 mg PO Q12 12/02/20 


Lisinopril [Prinivil 10 mg Tablet] 10 mg PO DAILY 12/02/20 


Metformin HCl [Metformin HCl ER] 750 mg PO DAILY 12/02/20 


Multivit-Min/FA/Lycopen/Lutein [Centrum Silver Men Tablet] 1 each PO DAILY 

12/02/20 


Tamsulosin HCl [Flomax 0.4 mg Cap.sr] 0.4 mg PO DAILY 12/02/20 











History of Present Illiness


History of Present Illness: 


BESS LOVELL is a 55 year old obese gentleman with PMHx diabetes mellitus 

(Metformin) and HTN (Lisinopril) who was referred by his PCP Elías Dowling PA-C, 

for evaluation of possible PE/DVT. Patient reports 1 month history progressively

worsening exertional shortness of breath with associated palpitations, 

orthopnea, PND, and lower extremity swelling. SOB onset with exertion, resolves 

after 30sec-1 minute rest. Additionally notes x1 month hx disproportionately 

swollen LLE with associated pain, specifically after standing for long periods 

of time. Denies hx DVT/PE, recent travel/trauma/surgery. No known sick 

contacts/exposure, but has been actively working at [employer]. Admits to 40yr 

smoking history, 1/2 ppd. Upon further questioning denies fever, chills, chest 

pain, cough, abd pain, nausea, vomiting or diarrhea. 





Per ED provider pt with O2 sat 80% on initial evaluation. Currently 96% on 2L 

NC. CBC unremarkable. Chemistries notable for hypercarbia, Cardiac enzymes 

negative, . CT chest concerning for patchy opacitis lower lung fields, 

cardiomegaly, and possible pulmonary hypertension; without pulmonary embolism or

effusion. LLE US without DVT. Patient treated with intranasal supplemental 

oxygen and referred to the hospitalist team for further evaluation, observation 

and management. 





Hospital Course


Hospital Course: 


Acute respiratory failure with hypoxia and hypercarbia / Obesity hypoventilation

syndrome / Exertional SOB 


On initial evaluation O2 sat 80% on room air. Currently low 90s on 2L NC. 


CO2 38 on BMP - indicating metabolic alkalosis


VBG s/p BiPap initiation pCO2 74.0. 


CT without evidence of pulmonary embolism or effusion. Evidence of bilateral 

lower lobe opacities and pulmonary HTN. 


Likely secondary to obesity hypoventilation syndrome in combination with 

undiagnosed obstructive sleep apnea.


  - OHS and ANA contributing to suspected pulmonary HTN, leading to right sided 

heart failure


  - Echo was not read at time of disposition, pt without edema, continued lasix 

po not indicated 


  - Repeat EKG without changes 


Dr. Martin, pulmonology consulted, discussed case, note reviewed in detail; 

recommends trilogy AVAPS/AE.


  - Pt without insurance, unwilling to pay


  - O2 sat 85% on RA at rest today, currently low 90s on 2L NC.


  - O2 sat 85% as per PCP note on 11/30/2020. 


  - Patient qualifies for home O2. Organized home O2. 


  - F/u with Dr. Martin in new year for Trilogy 








Lower Extremity edema: 


DVT ruled out.


  - Improved with single dose IV lasix. 


  - No further treatment indicated 








Lesion of penis


Reports 2 month history change in penile appearance.


Not sexually active.


Denies urinary symptoms at this time.


Reports burning sensation of skin with moisture. 


Has treated with Neosporin without noted improvement. 


  - UA without signs of infection


  - UC positive for Group C Beta Strep colony count 50,000-60,000 (Not 

infectious) 


Etiology unknown


DDx: Lichen sclerosis, Zoon's balantitis, Erythroplasia of [place], Maceracted 

due to urine 


  - Unfortunately no dermatology on campus 


  - Outpatient dermatology follow up , may require biopsy 


  - discussed with patient need to dermatology follow up for appropriate 

evaluation and treatment he is understanding of this


  - Set up dermatology follow up at discharge 








Diabetes mellitus type II, non insulin dependent 


Home medication includes Metformin 750mg daily.


  - Hem A1c 6.3


  - Dietician consulted 








Obesity, Class III, BMI 40-49.9 (morbid obesity)


BMI 49.0. 


  - Educated on lifestyle and dietary changes.


  - Candidate for bariatric surgery. 


  - Dietitian consulted 








Physical Exam


Vital Signs: 


                                        











Temp Pulse Resp BP Pulse Ox


 


 98.0 F   84   20   134/71 H  96 


 


 12/04/20 13:52  12/04/20 13:52  12/04/20 13:52  12/04/20 13:52  12/04/20 13:52








                                 Intake & Output











 12/03/20 12/04/20 12/05/20





 06:59 06:59 06:59


 


Intake Total 765 1520 475


 


Output Total 500  


 


Balance 265 1520 475


 


Weight 159.2 kg 157.9 kg 157.9 kg











Additional comments: 


General appearance: PRESENT: no acute distress, cooperative, morbidly obese


Head exam: PRESENT: atraumatic, normocephalic


Eye exam: PRESENT: EOMI.  ABSENT: scleral icterus


Mouth exam: PRESENT: moist, tongue midline


Neck exam: PRESENT: full ROM.  ABSENT: carotid bruit, JVD, lymphadenopathy, 

tenderness, thyromegaly


Respiratory exam: PRESENT: crackles - bilateral lower lobes, decreased breath 

sounds - Difficult to auscultate due to body habitus., symmetrical, unlabored.  

ABSENT: tachypnea, wheezes


Cardiovascular exam: PRESENT: RRR, +S1, +S2.  ABSENT: diastolic murmur, systolic

murmur


Pulses: PRESENT: normal radial pulses, normal dorsalis pedis pul


GI/Abdominal exam: PRESENT: normal bowel sounds, soft.  ABSENT: distended, firm,

tenderness


Rectal exam: PRESENT: deferred


Extremities exam: PRESENT: full ROM, previously noted edema resolved 


Musculoskeletal exam: PRESENT: ambulatory, full ROM.  ABSENT: deformity, 

dislocation


Neurological exam: PRESENT: alert, awake, oriented to person, oriented to place,

oriented to time, oriented to situation, CN II-XII grossly intact


Psychiatric exam: PRESENT: appropriate affect, normal mood


Skin exam: PRESENT: dry, intact, warm





Results


Laboratory Results: 


                                        











WBC  11.8 10^3/uL (4.0-10.5)  H  12/03/20  06:57    


 


RBC  4.46 10^6/uL (4.35-5.55)   12/03/20  06:57    


 


Hgb  14.7 g/dL (13.5-17.0)   12/03/20  06:57    


 


Hct  44.2 % (37.9-51.0)   12/03/20  06:57    


 


MCV  99 fl (80-97)  H  12/03/20  06:57    


 


MCH  33.0 pg (27.0-33.4)   12/03/20  06:57    


 


MCHC  33.4 g/dL (32.0-36.0)   12/03/20  06:57    


 


RDW  14.5 % (11.5-14.0)  H  12/03/20  06:57    


 


Plt Count  210 10^3/uL (150-450)   12/03/20  06:57    


 


Lymph % (Auto)  14.3 % (13-45)   12/02/20  12:11    


 


Mono % (Auto)  5.8 % (3-13)   12/02/20  12:11    


 


Eos % (Auto)  3.9 % (0-6)   12/02/20  12:11    


 


Baso % (Auto)  0.4 % (0-2)   12/02/20  12:11    


 


Absolute Neuts (auto)  7.9 10^3/uL (1.7-8.2)   12/02/20  12:11    


 


Absolute Lymphs (auto)  1.5 10^3/uL (0.5-4.7)   12/02/20  12:11    


 


Absolute Monos (auto)  0.6 10^3/uL (0.1-1.4)   12/02/20  12:11    


 


Absolute Eos (auto)  0.4 10^3/uL (0.0-0.6)   12/02/20  12:11    


 


Absolute Basos (auto)  0.0 10^3/uL (0.0-0.2)   12/02/20  12:11    


 


Seg Neutrophils %  75.6 % (42-78)   12/02/20  12:11    


 


Fibrinogen  493 mg/dL (209-497)   12/02/20  20:00    


 


Carbonic Acid  2.09 mmol/L (1.05-1.35)  H  12/03/20  12:38    


 


HCO3/H2CO3 Ratio  18:1   12/03/20  12:38    


 


ABG pH  7.36  (7.35-7.45)   12/03/20  12:38    


 


ABG pCO2  69.3 mmHg (35-45)  H*  12/03/20  12:38    


 


ABG pO2  46.6 mmHg ()  L  12/03/20  12:38    


 


ABG HCO3  38.5 mmol/L (20-24)  H  12/03/20  12:38    


 


ABG Total CO2  40.7 mmol/L (23-27)  H  12/03/20  12:38    


 


ABG O2 Saturation  79.3 % (94-98)  L  12/03/20  12:38    


 


ABG Base Excess  9.9 mmol/L  12/03/20  12:38    


 


VBG pH  7.30  (7.30-7.42)   12/03/20  06:57    


 


VBG pCO2  74.0 mmHg (35-63)  H*  12/03/20  06:57    


 


VBG HCO3  35.3 mmol/L (20-32)  H  12/03/20  06:57    


 


VBG Base Excess  5.7 mmol/L  12/03/20  06:57    


 


FiO2  ROOM AIR   12/03/20  12:38    


 


Sodium  135.7 mmol/L (137-145)  L  12/03/20  06:57    


 


Potassium  4.9 mmol/L (3.6-5.0)   12/03/20  06:57    


 


Chloride  94 mmol/L ()  L  12/03/20  06:57    


 


Carbon Dioxide  38 mmol/L (22-30)  H  12/03/20  06:57    


 


Anion Gap  4  (5-19)  L  12/03/20  06:57    


 


BUN  12 mg/dL (7-20)   12/03/20  06:57    


 


Creatinine  0.60 mg/dL (0.52-1.25)   12/03/20  06:57    


 


Est GFR ( Amer)  > 60  (>60)   12/03/20  06:57    


 


Est GFR (MDRD) Non-Af  > 60  (>60)   12/03/20  06:57    


 


Glucose  118 mg/dL ()  H  12/03/20  06:57    


 


POC Glucose  119 mg/dL ()  H  12/04/20  11:18    


 


Hemoglobin A1c %  6.3 % (4.7-6.0)  H  12/03/20  06:57    


 


Calcium  9.0 mg/dL (8.4-10.2)   12/03/20  06:57    


 


Magnesium  2.3 mg/dL (1.6-2.3)   12/03/20  06:57    


 


Ferritin  52.90 ng/mL (17.9-464.0)   12/02/20  20:00    


 


Total Bilirubin  0.5 mg/dL (0.2-1.3)   12/02/20  12:11    


 


Direct Bilirubin  0.1 mg/dL (0.0-0.4)   12/02/20  12:11    


 


Neonat Total Bilirubin  Not Reportable   12/02/20  12:11    


 


Neonat Direct Bilirubin  Not Reportable   12/02/20  12:11    


 


Neonat Indirect Bili  Not Reportable   12/02/20  12:11    


 


AST  38 U/L (17-59)   12/02/20  12:11    


 


ALT  67 U/L (<50)  H  12/02/20  12:11    


 


Alkaline Phosphatase  82 U/L ()   12/02/20  12:11    


 


Lactate Dehydrogenase  223 U/L (120-246)   12/02/20  20:00    


 


Troponin I  < 0.012 ng/mL  12/02/20  12:11    


 


C-Reactive Protein  21.8 mg/L (<10.0)  H  12/02/20  20:00    


 


NT-Pro-B Natriuret Pep  101 pg/mL (<125)   12/02/20  12:11    


 


Total Protein  7.0 g/dL (6.3-8.2)   12/02/20  12:11    


 


Albumin  4.1 g/dL (3.5-5.0)   12/02/20  12:11    


 


TSH  1.32 uIU/mL (0.47-4.68)   12/03/20  06:57    


 


Free T4  0.91 ng/dL (0.78-2.19)   12/03/20  06:57    


 


Urine Color  YELLOW   12/02/20  19:30    


 


Urine Appearance  CLEAR   12/02/20  19:30    


 


Urine pH  5.0  (5.0-9.0)   12/02/20  19:30    


 


Ur Specific Gravity  1.024   12/02/20  19:30    


 


Urine Protein  100 mg/dL (NEGATIVE)  H  12/02/20  19:30    


 


Urine Glucose (UA)  NEGATIVE mg/dL (NEGATIVE)   12/02/20  19:30    


 


Urine Ketones  NEGATIVE mg/dL (NEGATIVE)   12/02/20  19:30    


 


Urine Blood  NEGATIVE  (NEGATIVE)   12/02/20  19:30    


 


Urine Nitrite  NEGATIVE  (NEGATIVE)   12/02/20  19:30    


 


Urine Bilirubin  NEGATIVE  (NEGATIVE)   12/02/20  19:30    


 


Urine Urobilinogen  NEGATIVE mg/dL (<2.0)   12/02/20  19:30    


 


Ur Leukocyte Esterase  NEGATIVE  (NEGATIVE)   12/02/20  19:30    


 


Urine WBC (Auto)  2 /HPF  12/02/20  19:30    


 


Urine RBC (Auto)  0 /HPF  12/02/20  19:30    


 


Urine Bacteria (Auto)  TRACE /HPF  12/02/20  19:30    


 


Squamous Epi Cells Auto  1 /HPF  12/02/20  19:30    


 


Urine Mucus (Auto)  RARE /LPF  12/02/20  19:30    


 


Urine Ascorbic Acid  NEGATIVE  (NEGATIVE)   12/02/20  19:30    








                                        











  12/02/20





  12:11


 


Troponin I  < 0.012


 


NT-Pro-B Natriuret Pep  101











Impressions: 


                                        





Chest/Abdomen CTA  12/02/20 11:20


IMPRESSION:  1.  Cardiomegaly without pulmonary edema.


2.  Dilated main pulmonary artery - clinical correlation is recommended to 

exclude pulmonary hypertension


3.  No pulmonary emboli.


4.  The patchy opacities in the dependent portions of the lower lobes are 

favored to represent atelectasis, however correlation with clinical findings to 

exclude a pneumonia is recommended.


 








Venous Doppler Study  12/02/20 11:20


IMPRESSION:  NO EVIDENCE OF DVT OR SVT IN THE LEFT LEG.


 














Plan


Plan of Treatment: 


(1).  Your oxygen saturation, with amount of oxygen in your blood, was found to 

be low.  This we are giving you home oxygen that you are to use daily. 

Additionally you were seen by the pulmonologist, a lung doctor, who recommended 

trilogy unfortunately you have no insurance at this time, thus we are giving you

home oxygen! 


  -Follow-up with pulmonologist, Dr. Martin in the new year when you have 

insurance.  We have set this up for you.





(2).  Your difficulty with breathing was from your weight recommend dietary and 

lifestyle changes including portion control of your food and 30 minutes of 

physical activity daily.





(3).  For the changes of your penis I have given you mupirocin which is a 

topical ointment please apply this to your penis twice daily.  Strongly suggest 

following up with a dermatologist for further evaluation and treatment.





Goals: 


F/u with PCP, pulmonology and dermatology. 


Time Spent: Greater than 30 Minutes





Stroke


Is this a Stroke Patient?: No





Acute Heart Failure


Is this a Heart Failure Patient?: No

## 2020-12-05 NOTE — XCELERA REPORT
[address] [address]

                               Tel: [phone]

                               Fax: [phone]



                      Transthoracic Echocardiogram Report

_______________________________________________________________________________



Name: BESS LOVELL

MRN: [id number]                            Age: 55 yrs

Gender: Male                               : 1965

Patient Status: Inpatient                  Patient Location: [address] [place] [address]

Account #: [id number]

Study Date: 2020 06:21 PM

Accession #: [id number]

_______________________________________________________________________________



Height: 71 in        Weight: 352 lb        BSA: 2.7 m2

_______________________________________________________________________________

Procedure: A complete two-dimensional transthoracic echocardiogram was

performed (2D, M-mode, spectral and color flow Doppler). The study was

technically limited with all images being suboptimal in quality. Images from

the parasternal window were difficult to obtain and are suboptimal in quality.

The apical views were difficult to obtain and are suboptimal in quality. The

suprasternal notch views were difficult to obtain and are suboptimal in

quality.

Reason For Study: Exertional SOB





Ordering Physician: DARIEN TOTH

Performed By: Jacki Ulrich



_______________________________________________________________________________



Interpretation Summary

The left ventricle is grossly normal size.

There is mild concentric left ventricular hypertrophy.

Left ventricular systolic function is normal.

The Ejection Fraction estimate is 55-60%.

Doppler measurements suggest normal left ventricular diastolic function.

Regional wall motion abnormalities cannot be excluded due to limited

visualization.

Interventricular septum was not well visualized.

Cannot comment on TR due to poor visualization and and Doppler interrogation

of the vlave.

Trace MR.

No prior studies for comparison.



MMode/2D Measurements & Calculations

RVDd: 3.6 cm        LVIDd: 5.4 cm       FS: 31.0 %        Ao root diam:

IVSd: 1.3 cm        LVIDs: 3.8 cm       EDV(Teich):       3.4 cm

                                        143.9 ml          Ao root area:

                    LVPWd: 1.3 cm

                                        ESV(Teich):       8.9 cm2

                                        60.3 ml           LA dimension:

                                        EF(Teich): 58.1 % 4.0 cm

        _______________________________________________________________

LVLd ap4: 8.1 cm    SV(MOD-sp4):

EDV(MOD-sp4):       121.0 ml

182.0 ml

LVLs ap4: 6.2 cm



ESV(MOD-sp4):

61.0 ml

EF(MOD-sp4): 66.5 %



Doppler Measurements & Calculations

MV E max mel:      MV P1/2t max mel:      Ao V2 max:        LV V1 max P.7 cm/sec        123.7 cm/sec           137.6 cm/sec      7.1 mmHg

MV A max mle:      MV P1/2t: 78.1 msec    Ao max PG:        LV V1 max:

76.4 cm/sec        MVA(P1/2t): 2.8 cm2    7.6 mmHg          133.3 cm/sec

MV E/A: 1.3        MV dec slope:



                   463.8 cm/sec2

                   MV dec time: 0.26 sec

        _______________________________________________________________

PA V2 max:         MV P1/2t-pr_phl:

75.2 cm/sec        78.1 msec

PA max P.3 mmHg





Left Ventricle

The left ventricle is grossly normal size. There is mild concentric left

ventricular hypertrophy. Left ventricular systolic function is normal. The

Ejection Fraction estimate is 55-60%. Doppler measurements suggest normal left

ventricular diastolic function. Regional wall motion abnormalities cannot be

excluded due to limited visualization.



Right Ventricle

Borderline right ventricular enlargement. The right ventricular systolic

function is borderline reduced.



Atria

The right atrium is normal. The left atrium is mildly dilated.

Interventricular septum was not well visualized.



Mitral Valve

The mitral valve is grossly normal. There is no evidence of mitral valve

prolapse. There is a trace amount of mitral regurgitation.



Aortic Valve

The aortic valve is mildly calcified. The aortic valve is not well visualized

secondary to technical limitations. There is no aortic valve stenosis. No

aortic regurgitation is present.





Tricuspid Valve

The tricuspid valve is not well visualized secondary to technical limitations.

There is no tricuspid stenosis. Cannot comment on TR due to poor visualization

and and Doppler interrogation of the vlave.



Pulmonic Valve

The pulmonic valve is not well visualized. There is no pulmonic valvular

stenosis. There is no pulmonic valvular regurgitation.



Effusions

There is no pericardial effusion. There is no pleural effusion.





_______________________________________________________________________________

_______________________________________________________________________________

Electronically signed by:      Sascha Fang      on 2020 08:13 AM



CC: DARIEN TOTH Antonio